# Patient Record
Sex: MALE | Race: BLACK OR AFRICAN AMERICAN | NOT HISPANIC OR LATINO | Employment: FULL TIME | URBAN - METROPOLITAN AREA
[De-identification: names, ages, dates, MRNs, and addresses within clinical notes are randomized per-mention and may not be internally consistent; named-entity substitution may affect disease eponyms.]

---

## 2023-05-09 ENCOUNTER — HOSPITAL ENCOUNTER (EMERGENCY)
Facility: HOSPITAL | Age: 28
Discharge: HOME | End: 2023-05-09
Attending: EMERGENCY MEDICINE

## 2023-05-09 VITALS
SYSTOLIC BLOOD PRESSURE: 133 MMHG | TEMPERATURE: 98 F | WEIGHT: 165 LBS | RESPIRATION RATE: 18 BRPM | OXYGEN SATURATION: 99 % | HEART RATE: 91 BPM | BODY MASS INDEX: 25.01 KG/M2 | HEIGHT: 68 IN | DIASTOLIC BLOOD PRESSURE: 75 MMHG

## 2023-05-09 DIAGNOSIS — T16.2XXA FOREIGN BODY OF LEFT EAR, INITIAL ENCOUNTER: Primary | ICD-10-CM

## 2023-05-09 PROCEDURE — 99283 EMERGENCY DEPT VISIT LOW MDM: CPT

## 2023-05-09 PROCEDURE — 69200 CLEAR OUTER EAR CANAL: CPT | Mod: LT

## 2023-05-09 RX ORDER — NEOMYCIN SULFATE, POLYMYXIN B SULFATE, HYDROCORTISONE 3.5; 10000; 1 MG/ML; [USP'U]/ML; MG/ML
3 SOLUTION/ DROPS AURICULAR (OTIC) 3 TIMES DAILY
Qty: 10 ML | Refills: 0 | Status: SHIPPED | OUTPATIENT
Start: 2023-05-09 | End: 2023-05-16

## 2023-05-09 NOTE — ED PROVIDER NOTES
Encounter Date: 5/9/2023    SCRIBE #1 NOTE: I, Candido Sweeney am scribing for, and in the presence of,  GORDO Martínez. I have scribed the following portions of the note - Other sections scribed: HPI, ROS.     History     Chief Complaint   Patient presents with    Foreign Body in Ear     Pt to the ED with complaints of a rubber ear bud piece stuck in his left ear since just PTA.      CC: Foreign body in ear    HPI: Brock Zepeda is a 28 y.o. male who presents to the ED with his girlfriend for evaluation of earbud cover stuck in his left ear today. Pt reports his girlfriend tried to remove the earbud but scratched his inner ear instead, causing it to bleed. Pt denies any alleviating/aggravating factors. Pt denies taking any medications for his symptoms. Pt denies having any known allergies.     The history is provided by the patient and a significant other.   Review of patient's allergies indicates:  No Known Allergies  History reviewed. No pertinent past medical history.  History reviewed. No pertinent surgical history.  History reviewed. No pertinent family history.  Social History     Tobacco Use    Smoking status: Never    Smokeless tobacco: Never   Substance Use Topics    Alcohol use: Never    Drug use: Never     Review of Systems   Constitutional:  Negative for fever.   HENT:  Positive for ear pain. Negative for sore throat.         +Foreign body in ear   Respiratory:  Negative for shortness of breath.    Cardiovascular:  Negative for chest pain.   Gastrointestinal:  Negative for nausea.   Genitourinary:  Negative for dysuria.   Musculoskeletal:  Negative for back pain.   Skin:  Negative for rash.   Neurological:  Negative for weakness.   Hematological:  Does not bruise/bleed easily.   All other systems reviewed and are negative.    Physical Exam     Initial Vitals [05/09/23 1726]   BP Pulse Resp Temp SpO2   133/75 91 18 98 °F (36.7 °C) 99 %      MAP       --         Physical Exam    Nursing note and  vitals reviewed.  Constitutional: He appears well-developed and well-nourished. He is cooperative.  Non-toxic appearance.   HENT:   Head: Normocephalic and atraumatic.   Nose: Nose normal.   Mouth/Throat: Uvula is midline, oropharynx is clear and moist and mucous membranes are normal.   Black rubber foreign body noted to left ear canal- scratches noted to distal ear canal   Eyes: Conjunctivae and EOM are normal. Pupils are equal, round, and reactive to light.   Neck:   Normal range of motion.  Cardiovascular:  Normal rate, regular rhythm, S1 normal, S2 normal, normal heart sounds, intact distal pulses and normal pulses.     Exam reveals no gallop and no friction rub.       No murmur heard.  Pulmonary/Chest: Breath sounds normal. No stridor. No respiratory distress. He has no wheezes. He has no rhonchi. He has no rales. He exhibits no tenderness.   Abdominal: Abdomen is soft. Bowel sounds are normal. He exhibits no distension and no mass. There is no abdominal tenderness. There is no rebound and no guarding.   Musculoskeletal:         General: Normal range of motion.      Cervical back: Normal range of motion.     Lymphadenopathy:     He has no cervical adenopathy.   Neurological: He is alert and oriented to person, place, and time. He has normal strength. GCS eye subscore is 4. GCS verbal subscore is 5. GCS motor subscore is 6.   Skin: Skin is warm and intact. Capillary refill takes less than 2 seconds. No rash noted.   Psychiatric: He has a normal mood and affect.       ED Course   Foreign Body    Date/Time: 5/9/2023 8:25 PM  Performed by: GORDO Means  Authorized by: Diann Miller MD   Consent Done: Not Needed  Body area: ear  Location details: left ear    Patient sedated: no  Patient restrained: no  Localization method: visualized  Removal mechanism: alligator forceps  Complexity: simple  1 objects recovered.  Objects recovered: black rubber earbud cover  Post-procedure assessment: foreign body  removed  Patient tolerance: Patient tolerated the procedure well with no immediate complications    Labs Reviewed - No data to display       Imaging Results    None          Medications - No data to display  Medical Decision Making:   History:   Old Medical Records: I decided to obtain old medical records.  Initial Assessment:   27 y/o male with foreign body to left ear canal.  Differential Diagnosis:   Foreign body, ear canal laceration, TM rupture  ED Management:  Pt examined and noted to have a foreign body to his left ear canal. Removal was successful. Pt will be discharged with antibiotic ear drops as he scratched his ear canal attempting to remove object. Patient given strict return precautions and voiced understanding of all discharge instructions. Pt was stable at discharge.               Scribe Attestation:   Scribe #1: I performed the above scribed service and the documentation accurately describes the services I performed. I attest to the accuracy of the note.      ED Course as of 05/09/23 1843 Tue May 09, 2023   1747 BP: 133/75 [AT]   1747 Temp: 98 °F (36.7 °C) [AT]   1747 Temp Source: Oral [AT]   1747 Pulse: 91 [AT]   1747 Resp: 18 [AT]   1747 SpO2: 99 % [AT]      ED Course User Index  [AT] GORDO Means               Scribe attestation: I, GORDO Means, personally performed the services described in this documentation.  All medical record entries made by the scribe were at my direction and in my presence.  I have reviewed the chart and agree that the record reflects my personal performance and is accurate and complete.    Clinical Impression:   Final diagnoses:  [T16.2XXA] Foreign body of left ear, initial encounter (Primary)        ED Disposition Condition    Discharge Stable          ED Prescriptions       Medication Sig Dispense Start Date End Date Auth. Provider    neomycin-polymyxin-hydrocortisone (CORTISPORIN) otic solution Place 3 drops into the left ear 3 (three) times  daily. for 7 days 10 mL 5/9/2023 5/16/2023 GORDO Means          Follow-up Information       Follow up With Specialties Details Why Contact Info    primary care provider as needed                 GORDO Means  05/09/23 1955

## 2023-05-16 ENCOUNTER — OCCUPATIONAL HEALTH (OUTPATIENT)
Dept: URGENT CARE | Facility: CLINIC | Age: 28
End: 2023-05-16

## 2023-05-16 DIAGNOSIS — Z02.83 ENCOUNTER FOR DRUG SCREENING: Primary | ICD-10-CM

## 2023-05-16 LAB
CTP QC/QA: YES
POC 5 PANEL DRUG SCREEN: NEGATIVE

## 2023-05-16 PROCEDURE — 80305 DRUG TEST PRSMV DIR OPT OBS: CPT | Mod: S$GLB,,,

## 2023-05-16 PROCEDURE — 80305 POCT RAPID DRUG SCREEN 5 PANEL: ICD-10-PCS | Mod: S$GLB,,,
